# Patient Record
Sex: FEMALE | Race: OTHER | HISPANIC OR LATINO | URBAN - METROPOLITAN AREA
[De-identification: names, ages, dates, MRNs, and addresses within clinical notes are randomized per-mention and may not be internally consistent; named-entity substitution may affect disease eponyms.]

---

## 2023-08-17 LAB
ALANINE AMINOTRANSFERASE (SGPT) (U/L) IN SER/PLAS: 18 U/L (ref 7–45)
ALBUMIN (G/DL) IN SER/PLAS: 4.7 G/DL (ref 3.4–5)
ALKALINE PHOSPHATASE (U/L) IN SER/PLAS: 51 U/L (ref 33–110)
ANION GAP IN SER/PLAS: 12 MMOL/L (ref 10–20)
ASPARTATE AMINOTRANSFERASE (SGOT) (U/L) IN SER/PLAS: 23 U/L (ref 9–39)
BASOPHILS (10*3/UL) IN BLOOD BY AUTOMATED COUNT: 0.04 X10E9/L (ref 0–0.1)
BASOPHILS/100 LEUKOCYTES IN BLOOD BY AUTOMATED COUNT: 1 % (ref 0–2)
BILIRUBIN TOTAL (MG/DL) IN SER/PLAS: 0.7 MG/DL (ref 0–1.2)
C REACTIVE PROTEIN (MG/L) IN SER/PLAS: <0.1 MG/DL
CALCIUM (MG/DL) IN SER/PLAS: 9.9 MG/DL (ref 8.6–10.6)
CARBON DIOXIDE, TOTAL (MMOL/L) IN SER/PLAS: 26 MMOL/L (ref 21–32)
CHLORIDE (MMOL/L) IN SER/PLAS: 107 MMOL/L (ref 98–107)
CHOLESTEROL (MG/DL) IN SER/PLAS: 183 MG/DL (ref 0–199)
CHOLESTEROL IN HDL (MG/DL) IN SER/PLAS: 62.7 MG/DL
CHOLESTEROL/HDL RATIO: 2.9
COBALAMIN (VITAMIN B12) (PG/ML) IN SER/PLAS: 654 PG/ML (ref 211–911)
CREATININE (MG/DL) IN SER/PLAS: 0.9 MG/DL (ref 0.5–1.05)
EOSINOPHILS (10*3/UL) IN BLOOD BY AUTOMATED COUNT: 0.09 X10E9/L (ref 0–0.7)
EOSINOPHILS/100 LEUKOCYTES IN BLOOD BY AUTOMATED COUNT: 2.2 % (ref 0–6)
ERYTHROCYTE DISTRIBUTION WIDTH (RATIO) BY AUTOMATED COUNT: 13 % (ref 11.5–14.5)
ERYTHROCYTE MEAN CORPUSCULAR HEMOGLOBIN CONCENTRATION (G/DL) BY AUTOMATED: 32.4 G/DL (ref 32–36)
ERYTHROCYTE MEAN CORPUSCULAR VOLUME (FL) BY AUTOMATED COUNT: 97 FL (ref 80–100)
ERYTHROCYTES (10*6/UL) IN BLOOD BY AUTOMATED COUNT: 3.99 X10E12/L (ref 4–5.2)
FOLATE (NG/ML) IN SER/PLAS: >22.3 NG/ML
GFR FEMALE: >90 ML/MIN/1.73M2
GLUCOSE (MG/DL) IN SER/PLAS: 78 MG/DL (ref 74–99)
HEMATOCRIT (%) IN BLOOD BY AUTOMATED COUNT: 38.6 % (ref 36–46)
HEMOGLOBIN (G/DL) IN BLOOD: 12.5 G/DL (ref 12–16)
IMMATURE GRANULOCYTES/100 LEUKOCYTES IN BLOOD BY AUTOMATED COUNT: 0 % (ref 0–0.9)
LDL: 109 MG/DL (ref 0–119)
LEUKOCYTES (10*3/UL) IN BLOOD BY AUTOMATED COUNT: 4.1 X10E9/L (ref 4.4–11.3)
LYMPHOCYTES (10*3/UL) IN BLOOD BY AUTOMATED COUNT: 2.18 X10E9/L (ref 1.2–4.8)
LYMPHOCYTES/100 LEUKOCYTES IN BLOOD BY AUTOMATED COUNT: 52.8 % (ref 13–44)
MONOCYTES (10*3/UL) IN BLOOD BY AUTOMATED COUNT: 0.34 X10E9/L (ref 0.1–1)
MONOCYTES/100 LEUKOCYTES IN BLOOD BY AUTOMATED COUNT: 8.2 % (ref 2–10)
NEUTROPHILS (10*3/UL) IN BLOOD BY AUTOMATED COUNT: 1.48 X10E9/L (ref 1.2–7.7)
NEUTROPHILS/100 LEUKOCYTES IN BLOOD BY AUTOMATED COUNT: 35.8 % (ref 40–80)
NON HDL CHOLESTEROL: 120 MG/DL (ref 0–149)
NRBC (PER 100 WBCS) BY AUTOMATED COUNT: 0 /100 WBC (ref 0–0)
PLATELETS (10*3/UL) IN BLOOD AUTOMATED COUNT: 224 X10E9/L (ref 150–450)
POTASSIUM (MMOL/L) IN SER/PLAS: 4.6 MMOL/L (ref 3.5–5.3)
PROTEIN TOTAL: 6.8 G/DL (ref 6.4–8.2)
SEDIMENTATION RATE, ERYTHROCYTE: <1 MM/H (ref 0–20)
SODIUM (MMOL/L) IN SER/PLAS: 140 MMOL/L (ref 136–145)
THYROTROPIN (MIU/L) IN SER/PLAS BY DETECTION LIMIT <= 0.05 MIU/L: 0.2 MIU/L (ref 0.44–3.98)
THYROXINE (T4) FREE (NG/DL) IN SER/PLAS: 0.7 NG/DL (ref 0.61–1.12)
TISSUE TRANSGLUTAMINASE, IGA: <1 U/ML (ref 0–14)
TRIGLYCERIDE (MG/DL) IN SER/PLAS: 58 MG/DL (ref 0–149)
UREA NITROGEN (MG/DL) IN SER/PLAS: 21 MG/DL (ref 6–23)
VLDL: 12 MG/DL (ref 0–40)

## 2023-11-28 ENCOUNTER — TELEMEDICINE (OUTPATIENT)
Dept: DERMATOLOGY | Facility: CLINIC | Age: 23
End: 2023-11-28
Payer: COMMERCIAL

## 2023-11-28 DIAGNOSIS — L73.0 ACNE SCARRING: ICD-10-CM

## 2023-11-28 DIAGNOSIS — L70.0 ACNE VULGARIS: Primary | ICD-10-CM

## 2023-11-28 PROCEDURE — 99213 OFFICE O/P EST LOW 20 MIN: CPT | Performed by: DERMATOLOGY

## 2023-11-28 RX ORDER — CLINDAMYCIN PHOSPHATE 10 UG/ML
LOTION TOPICAL 2 TIMES DAILY
Qty: 60 ML | Refills: 11 | Status: SHIPPED | OUTPATIENT
Start: 2023-11-28 | End: 2024-11-27

## 2023-11-28 RX ORDER — BENZOYL PEROXIDE 50 MG/ML
1 LIQUID TOPICAL DAILY
Qty: 227 G | Refills: 11 | Status: SHIPPED | OUTPATIENT
Start: 2023-11-28 | End: 2024-11-27

## 2023-11-28 NOTE — PROGRESS NOTES
Subjective     Jen Pathak is a 23 y.o. female who presents for the following: Acne.     Virtual visit for follow up of acne vulgaris. Patient currently using BP wash BID and clindamycin 1% lotion BID. She stopped using spironolactone in June due to side effects - she noted increased urination and headaches. 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 Patient also discontinued adapalene 0.1% gel 1 month ago as she noticed tightening of her face after use.     Previous Treatments:   -Epiduo Forte - helped   -Minocycline - helped Context LCV 2021  -Spironolactone - increased urination, headaches, stopped because she was worried about s/e with cross country races    Review of Systems:  No other skin or systemic complaints other than what is documented elsewhere in the note.    The following portions of the chart were reviewed this encounter and updated as appropriate:          Skin Cancer History  No skin cancer on file.      Specialty Problems    None       Objective   Well appearing patient in no apparent distress; mood and affect are within normal limits.    A focused skin examination was performed. All findings within normal limits unless otherwise noted below.    Assessment/Plan   1. Acne vulgaris  Generalized, Head - Anterior (Face)  Mixed comedonal and papular acne on forehead, cheeks, and chin    Acne vulgaris - mild to moderate, mixed comedonal and inflammatory   -We reviewed the pathogenesis of acne in detail including multifactorial contributing components including components of follicular occlusion, hormonal influences, and inflammatory processes.  -Discussed patient's current regimen and potential treatment options. She is currently not using a facial moisturizer. Will restart adapalene 0.1% gel with the addition of an OTC facial moisturizer.   -Start an over the counter non-scented, non-comedogenic moisturizer of your choice twice daily. Examples:  Neutrogena, Cetaphil, and CeraVe.   -Restart adapalene 0.1% gel - apply small pea sized amount to clean dry face 10 minutes after washing at bedtime, start off BIW. Reviewed side effects of topical retinoids including dryness and irritation.   -Continue benzoyl peroxide wash to face 1-2x/day. Warned that benzoyl peroxide  may bleach sheets and towels.  -Continue clindamycin 1% lotion BID, thin layer to entire face    Related Procedures  Follow Up In Dermatology - Established Patient    Related Medications  clindamycin (Cleocin T) 1 % lotion  Apply topically 2 times a day.    benzoyl peroxide 5 % external wash  Apply 1 Application topically once daily. Dispense 240 mL    2. Acne scarring  Ice pick scarring on bilateral cheeks in areas of previous acne     -Discussed that scars will continue to improve with time.   -Additionally, topical retinoids can help prevent future scarring. See above.       RTC in 6 months VV for follow up of acne vulgaris.     Marichuy Powell,     I saw and evaluated the patient. I personally obtained the key and critical portions of the history and physical exam or was physically present for key and critical portions performed by the resident/fellow. I reviewed the resident/fellow's documentation and discussed the patient with the resident/fellow. I agree with the resident/fellow's medical decision making as documented in the note.    Sunny Tyson MD PhD

## 2023-12-27 ENCOUNTER — LAB (OUTPATIENT)
Dept: LAB | Facility: LAB | Age: 23
End: 2023-12-27
Payer: COMMERCIAL

## 2023-12-27 DIAGNOSIS — T73.3XXA FATIGUE DUE TO EXCESSIVE EXERTION, INITIAL ENCOUNTER: Primary | ICD-10-CM

## 2023-12-27 DIAGNOSIS — T73.3XXA FATIGUE DUE TO EXCESSIVE EXERTION, INITIAL ENCOUNTER: ICD-10-CM

## 2023-12-27 LAB
25(OH)D3 SERPL-MCNC: 44 NG/ML (ref 30–100)
ALBUMIN SERPL BCP-MCNC: 4.6 G/DL (ref 3.4–5)
ALP SERPL-CCNC: 47 U/L (ref 33–110)
ALT SERPL W P-5'-P-CCNC: 13 U/L (ref 7–45)
ANION GAP SERPL CALC-SCNC: 11 MMOL/L (ref 10–20)
AST SERPL W P-5'-P-CCNC: 24 U/L (ref 9–39)
BASOPHILS # BLD AUTO: 0.04 X10*3/UL (ref 0–0.1)
BASOPHILS NFR BLD AUTO: 0.7 %
BILIRUB SERPL-MCNC: 0.4 MG/DL (ref 0–1.2)
BUN SERPL-MCNC: 18 MG/DL (ref 6–23)
CALCIUM SERPL-MCNC: 9.6 MG/DL (ref 8.6–10.3)
CHLORIDE SERPL-SCNC: 105 MMOL/L (ref 98–107)
CO2 SERPL-SCNC: 27 MMOL/L (ref 21–32)
CREAT SERPL-MCNC: 0.78 MG/DL (ref 0.5–1.05)
EOSINOPHIL # BLD AUTO: 0.06 X10*3/UL (ref 0–0.7)
EOSINOPHIL NFR BLD AUTO: 1 %
ERYTHROCYTE [DISTWIDTH] IN BLOOD BY AUTOMATED COUNT: 12.2 % (ref 11.5–14.5)
FERRITIN SERPL-MCNC: 194 NG/ML (ref 8–150)
GFR SERPL CREATININE-BSD FRML MDRD: >90 ML/MIN/1.73M*2
GLUCOSE SERPL-MCNC: 98 MG/DL (ref 74–99)
HCT VFR BLD AUTO: 38.9 % (ref 36–46)
HGB BLD-MCNC: 12.6 G/DL (ref 12–16)
IMM GRANULOCYTES # BLD AUTO: 0.01 X10*3/UL (ref 0–0.7)
IMM GRANULOCYTES NFR BLD AUTO: 0.2 % (ref 0–0.9)
IRON SATN MFR SERPL: 32 % (ref 25–45)
IRON SERPL-MCNC: 111 UG/DL (ref 35–150)
LYMPHOCYTES # BLD AUTO: 2.54 X10*3/UL (ref 1.2–4.8)
LYMPHOCYTES NFR BLD AUTO: 42.6 %
MCH RBC QN AUTO: 31 PG (ref 26–34)
MCHC RBC AUTO-ENTMCNC: 32.4 G/DL (ref 32–36)
MCV RBC AUTO: 96 FL (ref 80–100)
MONOCYTES # BLD AUTO: 0.5 X10*3/UL (ref 0.1–1)
MONOCYTES NFR BLD AUTO: 8.4 %
NEUTROPHILS # BLD AUTO: 2.81 X10*3/UL (ref 1.2–7.7)
NEUTROPHILS NFR BLD AUTO: 47.1 %
NRBC BLD-RTO: 0 /100 WBCS (ref 0–0)
PLATELET # BLD AUTO: 256 X10*3/UL (ref 150–450)
POTASSIUM SERPL-SCNC: 4.2 MMOL/L (ref 3.5–5.3)
PROT SERPL-MCNC: 6.9 G/DL (ref 6.4–8.2)
RBC # BLD AUTO: 4.07 X10*6/UL (ref 4–5.2)
SODIUM SERPL-SCNC: 139 MMOL/L (ref 136–145)
TIBC SERPL-MCNC: 342 UG/DL (ref 240–445)
TSH SERPL-ACNC: 0.56 MIU/L (ref 0.44–3.98)
UIBC SERPL-MCNC: 231 UG/DL (ref 110–370)
WBC # BLD AUTO: 6 X10*3/UL (ref 4.4–11.3)

## 2023-12-27 PROCEDURE — 36415 COLL VENOUS BLD VENIPUNCTURE: CPT

## 2023-12-27 PROCEDURE — 82728 ASSAY OF FERRITIN: CPT

## 2023-12-27 PROCEDURE — 83550 IRON BINDING TEST: CPT

## 2023-12-27 PROCEDURE — 83540 ASSAY OF IRON: CPT

## 2023-12-27 PROCEDURE — 82306 VITAMIN D 25 HYDROXY: CPT

## 2023-12-27 PROCEDURE — 80053 COMPREHEN METABOLIC PANEL: CPT

## 2023-12-27 PROCEDURE — 84443 ASSAY THYROID STIM HORMONE: CPT

## 2023-12-27 PROCEDURE — 85025 COMPLETE CBC W/AUTO DIFF WBC: CPT

## 2024-01-03 ENCOUNTER — TELEMEDICINE (OUTPATIENT)
Dept: ORTHOPEDIC SURGERY | Facility: CLINIC | Age: 24
End: 2024-01-03
Payer: COMMERCIAL

## 2024-01-03 DIAGNOSIS — T73.3XXA FATIGUE DUE TO EXCESSIVE EXERTION, INITIAL ENCOUNTER: Primary | ICD-10-CM

## 2024-01-03 DIAGNOSIS — N91.1 AMENORRHEA, SECONDARY: ICD-10-CM

## 2024-01-03 DIAGNOSIS — M48.48XS STRESS FRACTURE OF SACRUM, SEQUELA: ICD-10-CM

## 2024-01-03 PROCEDURE — 99204 OFFICE O/P NEW MOD 45 MIN: CPT | Performed by: PEDIATRICS

## 2024-01-03 NOTE — PROGRESS NOTES
No chief complaint on file.      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  Struggling w amenorrhea for 3 years.  When mileage increases loses period. Comes back with decreased mileage  Mild sacral stress reaction R side.     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 Starting masters   Gets 7 hours of sleep.  50% of time   Could get more sleep.  ADHD, forgets to go to bed.     Labs at school  dec 15:  T3 69 ()  T4 0.7   Estrogen 19 ()  Most recent period was the week before those labs  On iron supplement 3 years    Uses nutrition at school -they set her up with an glenna that she uses that when she enters her data she seems to be getting all of the necessary macronutrients.    LMP Dec 7 (apporx) none for several months prior 3/12    Order DXA, cbc, ferritin, iron, T3  ? H pylori       Past [facility] HX:  Specialty Problems    None       ROS  12 point ROS reviewed and is negative except for items listed   Fatigue     Social Hx:  Home:  3 sibs, parents  Sports: XC, track:   School:  [employer]       Medications:   Current Outpatient Medications on File Prior to Visit   Medication Sig Dispense Refill    benzoyl peroxide 5 % external wash Apply 1 Application topically once daily. Dispense 240 mL 227 g 11    clindamycin (Cleocin T) 1 % lotion Apply topically 2 times a day. 60 mL 11     No current facility-administered medications on file prior to visit.         Allergies:  Not on File     Physical Exam:    There were no vitals taken for this visit.   General appearance: Well-appearing well-nourished  Psych: Normal mood and affect    Vascular: No extremity edema or  discoloration.  Skin: negative.    Eyes: no conjunctival injection.      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 No body image issues.  No resistance to increasing calories.     Plan: DXA, repeat ferritin, CBC, h pylori if GI testing abnormal.    We discussed that because she gets her period when her mileage decreases that the most likely cause of her amenorrhea is decreased calorie intake.  She is continuing on with her GI workup to rule out malabsorption, celiac testing was negative.  I would consider H. pylori testing if her next evaluation is abnormal.    As a strategy for increasing her calories since she knows she usually gets her period when she is training around 50 miles a week she will continue to eat the same number of calories on a daily basis that she does when she is running 50 miles a week and for every mile above 50 she will add in 100 rupal to her diet that she eats regularly.    I would not recommend increasing her weekly mileage to ED for the next season since she got a stress fracture at 70 miles per week.  Instead I would increase the intensity of her training runs and add crosstraining days as her recovery days instead of long slow recovery rounds.    We discussed strategies to improve her sleep.  She can set an alarm on her phone to remind her when to go to bed she can use melatonin during the transition.  To help her feel sleepy earlier and try 400 mg of magnesium glycinate in the evening as well.  55 min virtual visit     ** Please excuse any errors in grammar or translation related to this dictation. Voice recognition software was utilized to prepare this document. **

## 2024-02-29 ENCOUNTER — APPOINTMENT (OUTPATIENT)
Dept: PRIMARY CARE | Facility: CLINIC | Age: 24
End: 2024-02-29
Payer: COMMERCIAL

## 2024-02-29 ENCOUNTER — APPOINTMENT (OUTPATIENT)
Dept: ORTHOPEDIC SURGERY | Facility: CLINIC | Age: 24
End: 2024-02-29
Payer: COMMERCIAL

## 2024-03-01 ENCOUNTER — APPOINTMENT (OUTPATIENT)
Dept: GASTROENTEROLOGY | Facility: HOSPITAL | Age: 24
End: 2024-03-01
Payer: COMMERCIAL

## 2024-04-19 ENCOUNTER — APPOINTMENT (OUTPATIENT)
Dept: GASTROENTEROLOGY | Facility: HOSPITAL | Age: 24
End: 2024-04-19
Payer: COMMERCIAL

## 2024-05-17 ENCOUNTER — APPOINTMENT (OUTPATIENT)
Dept: ENDOCRINOLOGY | Facility: CLINIC | Age: 24
End: 2024-05-17
Payer: COMMERCIAL

## 2024-05-28 ENCOUNTER — APPOINTMENT (OUTPATIENT)
Dept: DERMATOLOGY | Facility: CLINIC | Age: 24
End: 2024-05-28
Payer: COMMERCIAL

## 2024-07-10 NOTE — TELEPHONE ENCOUNTER
Attempted to speak with patient in regards to rescheduling cancelled procedure. VM left for patient to return call to office at earliest convenience.

## 2024-07-31 ENCOUNTER — TELEPHONE (OUTPATIENT)
Dept: GASTROENTEROLOGY | Facility: CLINIC | Age: 24
End: 2024-07-31
Payer: COMMERCIAL

## 2024-07-31 NOTE — TELEPHONE ENCOUNTER
Left message for patient to call back to schedule egd with josie at [facility]; left number for contact.

## 2024-08-02 ENCOUNTER — HOSPITAL ENCOUNTER (OUTPATIENT)
Dept: GASTROENTEROLOGY | Facility: HOSPITAL | Age: 24
Setting detail: OUTPATIENT SURGERY
Discharge: HOME | End: 2024-08-02
Payer: COMMERCIAL

## 2024-08-02 ENCOUNTER — ANESTHESIA EVENT (OUTPATIENT)
Dept: GASTROENTEROLOGY | Facility: HOSPITAL | Age: 24
End: 2024-08-02
Payer: COMMERCIAL

## 2024-08-02 ENCOUNTER — ANESTHESIA (OUTPATIENT)
Dept: GASTROENTEROLOGY | Facility: HOSPITAL | Age: 24
End: 2024-08-02
Payer: COMMERCIAL

## 2024-08-02 VITALS
HEIGHT: 67 IN | WEIGHT: 145 LBS | HEART RATE: 85 BPM | BODY MASS INDEX: 22.76 KG/M2 | OXYGEN SATURATION: 97 % | SYSTOLIC BLOOD PRESSURE: 101 MMHG | RESPIRATION RATE: 16 BRPM | DIASTOLIC BLOOD PRESSURE: 59 MMHG | TEMPERATURE: 96.8 F

## 2024-08-02 DIAGNOSIS — R14.0 ABDOMINAL DISTENSION (GASEOUS): ICD-10-CM

## 2024-08-02 DIAGNOSIS — R12 HEARTBURN: ICD-10-CM

## 2024-08-02 LAB — HCG UR QL IA.RAPID: NEGATIVE

## 2024-08-02 PROCEDURE — 2500000005 HC RX 250 GENERAL PHARMACY W/O HCPCS: Performed by: ANESTHESIOLOGIST ASSISTANT

## 2024-08-02 PROCEDURE — 2500000001 HC RX 250 WO HCPCS SELF ADMINISTERED DRUGS (ALT 637 FOR MEDICARE OP): Performed by: ANESTHESIOLOGIST ASSISTANT

## 2024-08-02 PROCEDURE — 2500000004 HC RX 250 GENERAL PHARMACY W/ HCPCS (ALT 636 FOR OP/ED): Performed by: ANESTHESIOLOGIST ASSISTANT

## 2024-08-02 PROCEDURE — 81025 URINE PREGNANCY TEST: CPT | Performed by: STUDENT IN AN ORGANIZED HEALTH CARE EDUCATION/TRAINING PROGRAM

## 2024-08-02 PROCEDURE — 43235 EGD DIAGNOSTIC BRUSH WASH: CPT | Performed by: STUDENT IN AN ORGANIZED HEALTH CARE EDUCATION/TRAINING PROGRAM

## 2024-08-02 RX ORDER — DEXTROAMPHETAMINE SACCHARATE, AMPHETAMINE ASPARTATE, DEXTROAMPHETAMINE SULFATE AND AMPHETAMINE SULFATE 7.5; 7.5; 7.5; 7.5 MG/1; MG/1; MG/1; MG/1
30 TABLET ORAL DAILY
COMMUNITY

## 2024-08-02 RX ORDER — PROPOFOL 10 MG/ML
INJECTION, EMULSION INTRAVENOUS AS NEEDED
Status: DISCONTINUED | OUTPATIENT
Start: 2024-08-02 | End: 2024-08-02

## 2024-08-02 RX ORDER — MIDAZOLAM HYDROCHLORIDE 1 MG/ML
INJECTION, SOLUTION INTRAMUSCULAR; INTRAVENOUS AS NEEDED
Status: DISCONTINUED | OUTPATIENT
Start: 2024-08-02 | End: 2024-08-02

## 2024-08-02 RX ORDER — GLYCOPYRROLATE 0.2 MG/ML
INJECTION INTRAMUSCULAR; INTRAVENOUS AS NEEDED
Status: DISCONTINUED | OUTPATIENT
Start: 2024-08-02 | End: 2024-08-02

## 2024-08-02 RX ORDER — LIDOCAINE HYDROCHLORIDE 20 MG/ML
INJECTION, SOLUTION EPIDURAL; INFILTRATION; INTRACAUDAL; PERINEURAL AS NEEDED
Status: DISCONTINUED | OUTPATIENT
Start: 2024-08-02 | End: 2024-08-02

## 2024-08-02 SDOH — HEALTH STABILITY: MENTAL HEALTH: CURRENT SMOKER: 0

## 2024-08-02 ASSESSMENT — PAIN SCALES - GENERAL
PAINLEVEL_OUTOF10: 0 - NO PAIN
PAIN_LEVEL: 0
PAINLEVEL_OUTOF10: 0 - NO PAIN

## 2024-08-02 ASSESSMENT — PAIN - FUNCTIONAL ASSESSMENT
PAIN_FUNCTIONAL_ASSESSMENT: 0-10
PAIN_FUNCTIONAL_ASSESSMENT: 0-10

## 2024-08-02 NOTE — ANESTHESIA POSTPROCEDURE EVALUATION
Patient: Jen Pathak    Procedure Summary       Date: 08/02/24 Room / Location: [facility]    Anesthesia Start: 1413 Anesthesia Stop: 1438    Procedures:       EGD      BRAVO Diagnosis:       Abdominal distension (gaseous)      Heartburn    Scheduled Providers: Daily Huerta MD Responsible Provider: Jasmeet Cline MD    Anesthesia Type: MAC ASA Status: Not recorded            Anesthesia Type: MAC    Vitals Value Taken Time   /56 08/02/24 1438   Temp 36.1 °C (97 °F) 08/02/24 1438   Pulse 53 08/02/24 1438   Resp 12 08/02/24 1438   SpO2 98 % 08/02/24 1438       Anesthesia Post Evaluation    Patient location during evaluation: PACU  Patient participation: complete - patient participated  Level of consciousness: sleepy but conscious  Pain score: 0  Pain management: adequate  Airway patency: patent  Cardiovascular status: acceptable  Respiratory status: acceptable  Hydration status: acceptable  Postoperative Nausea and Vomiting: none    No notable events documented.

## 2024-08-02 NOTE — H&P
Procedure H&P    Patient Profile-Procedures  Name Jen Hwang  Date of Birth 2000  MRN [id number]  Address   [address] [place] [id number] [place] [address]    Primary Phone Number [phone]  Secondary Phone Number    Yelena Leonard    Procedure(s):  Procedures: EGD  Primary contact name and number   Extended Emergency Contact Information  Primary Emergency Contact: GIORGI HWANG  Mobile Phone: [phone]  Relation: Mother  Preferred language: English   needed? No    General Health  Weight There were no vitals filed for this visit.  BMI There is no height or weight on file to calculate BMI.    Allergies  No Known Allergies    Past Medical History   Past Medical History:   Diagnosis Date    Encounter for routine child health examination without abnormal findings 01/29/2020    Encounter for routine child health examination without abnormal findings    Left lower quadrant pain 04/08/2020    Abdominal pain, acute, left lower quadrant    Other injury of other muscle(s) and tendon(s) at lower leg level, unspecified leg, initial encounter 08/14/2019    Bobby splint    Other specified health status     No pertinent past medical history    Pain in right leg 08/07/2019    Pain in both lower extremities    Personal history of other diseases of the digestive system 04/08/2020    History of umbilical hernia    Personal history of other specified conditions 09/09/2020    History of epigastric pain    Personal history of other specified conditions 08/14/2019    History of fatigue    Personal history of other specified conditions 11/17/2017    History of urinary frequency    Strain of muscle, fascia and tendon of abdomen, initial encounter 04/08/2020    Strain of rectus abdominis muscle, initial encounter    Stress incontinence (female) (male)     Female stress incontinence    Unspecified abdominal pain 04/06/2020    Stomach pain    Urge incontinence 11/17/2017    Urge  incontinence of urine       Provider assessment  Diagnosis:  EGD with Bravo placement  Medication Reviewed - yes  Prior to Admission medications    Medication Sig Start Date End Date Taking? Authorizing Provider   benzoyl peroxide 5 % external wash Apply 1 Application topically once daily. Dispense 240 mL 11/28/23 11/27/24  Marichuy Powell, DO   clindamycin (Cleocin T) 1 % lotion Apply topically 2 times a day. 11/28/23 11/27/24  Marichuy Powell, DO       Physical Exam  There were no vitals filed for this visit.     General: A&Ox3, NAD.  HEENT: AT/NC.   CV: RRR. No murmur.  Resp: CTA bilaterally. No wheezing, rhonchi or rales.   GI: Soft, NT/ND. BSx4.  Extrem: No edema. Pulses intact.  Neuro: No focal deficits.   Psych: Normal mood and affect.      Procedure Plan - pre-procedural (re)assesment completed by physician:  discharge/transfer patient when discharge criteria met    Daily Huerta MD  8/2/2024 1:23 PM

## 2024-08-02 NOTE — ANESTHESIA PREPROCEDURE EVALUATION
Patient: Jen Pathak    Procedure Information       Anesthesia Start Date/Time: 08/02/24 1413    Scheduled providers: Daily Huerta MD    Procedures:       EGD      BRAVO    Location: [facility]            Relevant Problems   No relevant active problems       Clinical information reviewed:   Tobacco  Allergies  Meds   Med Hx  Surg Hx   Fam Hx  Soc Hx        NPO Detail:  NPO/Void Status  Date of Last Liquid: 08/02/24  Time of Last Liquid: 1030  Date of Last Solid: 08/01/24  Time of Last Solid: 2300  Time of Last Void: 1345         Physical Exam    Airway  Mallampati: I  TM distance: >3 FB  Neck ROM: full     Cardiovascular   Rhythm: regular  Rate: normal     Dental - normal exam     Pulmonary   Breath sounds clear to auscultation     Abdominal            Anesthesia Plan    History of general anesthesia?: yes  History of complications of general anesthesia?: no    ASA 1     general     The patient is not a current smoker.    intravenous induction   Anesthetic plan and risks discussed with patient.    Plan discussed with CAA.

## 2024-08-12 ENCOUNTER — APPOINTMENT (OUTPATIENT)
Dept: PRIMARY CARE | Facility: CLINIC | Age: 24
End: 2024-08-12
Payer: COMMERCIAL

## 2024-08-12 VITALS
HEART RATE: 57 BPM | SYSTOLIC BLOOD PRESSURE: 111 MMHG | WEIGHT: 148 LBS | BODY MASS INDEX: 22.43 KG/M2 | OXYGEN SATURATION: 99 % | TEMPERATURE: 97.1 F | HEIGHT: 68 IN | DIASTOLIC BLOOD PRESSURE: 62 MMHG | RESPIRATION RATE: 16 BRPM

## 2024-08-12 DIAGNOSIS — Z00.00 HEALTHCARE MAINTENANCE: Primary | ICD-10-CM

## 2024-08-12 PROBLEM — N94.6 DYSMENORRHEA IN ADOLESCENT: Status: RESOLVED | Noted: 2024-08-12 | Resolved: 2024-08-12

## 2024-08-12 PROBLEM — N93.9 ABNORMAL UTERINE BLEEDING: Status: RESOLVED | Noted: 2023-11-22 | Resolved: 2024-08-12

## 2024-08-12 PROBLEM — R14.0 BLOATING: Status: ACTIVE | Noted: 2024-08-12

## 2024-08-12 PROBLEM — K42.9 UMBILICAL HERNIA: Status: RESOLVED | Noted: 2024-08-12 | Resolved: 2024-08-12

## 2024-08-12 PROBLEM — Q67.7 PECTUS CARINATUM: Status: RESOLVED | Noted: 2024-08-12 | Resolved: 2024-08-12

## 2024-08-12 PROBLEM — K21.9 GASTROESOPHAGEAL REFLUX DISEASE: Status: ACTIVE | Noted: 2024-08-12

## 2024-08-12 PROBLEM — N91.1 SECONDARY AMENORRHEA: Status: RESOLVED | Noted: 2023-11-22 | Resolved: 2024-08-12

## 2024-08-12 PROBLEM — R41.840 POOR CONCENTRATION: Status: RESOLVED | Noted: 2024-08-12 | Resolved: 2024-08-12

## 2024-08-12 PROBLEM — L70.0 ACNE VULGARIS: Status: ACTIVE | Noted: 2021-05-28

## 2024-08-12 PROBLEM — R59.1 LYMPHADENOPATHY: Status: RESOLVED | Noted: 2024-08-12 | Resolved: 2024-08-12

## 2024-08-12 PROBLEM — K59.09 CHRONIC CONSTIPATION: Status: ACTIVE | Noted: 2024-08-12

## 2024-08-12 PROBLEM — M81.0 OSTEOPOROSIS: Status: RESOLVED | Noted: 2024-02-01 | Resolved: 2024-08-12

## 2024-08-12 LAB
NON-UH HIE A/G RATIO: 1.6
NON-UH HIE ALB: 4.2 G/DL (ref 3.4–5)
NON-UH HIE ALK PHOS: 67 UNIT/L (ref 45–117)
NON-UH HIE BASO COUNT: 0.04 X1000 (ref 0–0.2)
NON-UH HIE BASOS %: 0.8 %
NON-UH HIE BILIRUBIN, TOTAL: 0.6 MG/DL (ref 0.3–1.2)
NON-UH HIE BUN/CREAT RATIO: 28.8
NON-UH HIE BUN: 23 MG/DL (ref 9–23)
NON-UH HIE CALCIUM: 9.7 MG/DL (ref 8.7–10.4)
NON-UH HIE CALCULATED LDL CHOLESTEROL: 109 MG/DL (ref 60–130)
NON-UH HIE CALCULATED OSMOLALITY: 281 MOSM/KG (ref 275–295)
NON-UH HIE CHLORIDE: 106 MMOL/L (ref 98–107)
NON-UH HIE CHOLESTEROL: 180 MG/DL (ref 100–200)
NON-UH HIE CO2, VENOUS: 26 MMOL/L (ref 20–31)
NON-UH HIE CREATININE: 0.8 MG/DL (ref 0.5–0.8)
NON-UH HIE DIFF?: NO
NON-UH HIE EOS COUNT: 0.07 X1000 (ref 0–0.5)
NON-UH HIE EOSIN %: 1.3 %
NON-UH HIE GFR AA: >60
NON-UH HIE GLOBULIN: 2.7 G/DL
NON-UH HIE GLOMERULAR FILTRATION RATE: >60 ML/MIN/1.73M?
NON-UH HIE GLUCOSE: 86 MG/DL (ref 74–106)
NON-UH HIE GOT: 34 UNIT/L (ref 15–37)
NON-UH HIE GPT: 24 UNIT/L (ref 10–49)
NON-UH HIE HCT: 40.7 % (ref 36–46)
NON-UH HIE HDL CHOLESTEROL: 60 MG/DL (ref 40–60)
NON-UH HIE HGB: 13.4 G/DL (ref 12–16)
NON-UH HIE INSTR WBC: 5.7
NON-UH HIE K: 4.9 MMOL/L (ref 3.5–5.1)
NON-UH HIE LYMPH %: 33 %
NON-UH HIE LYMPH COUNT: 1.89 X1000 (ref 1.2–4.8)
NON-UH HIE MCH: 30.5 PG (ref 27–34)
NON-UH HIE MCHC: 33 G/DL (ref 32–37)
NON-UH HIE MCV: 92.5 FL (ref 80–100)
NON-UH HIE MONO %: 10.4 %
NON-UH HIE MONO COUNT: 0.6 X1000 (ref 0.1–1)
NON-UH HIE MPV: 8.4 FL (ref 7.4–10.4)
NON-UH HIE NA: 139 MMOL/L (ref 135–145)
NON-UH HIE NEUTROPHIL %: 54.5 %
NON-UH HIE NEUTROPHIL COUNT (ANC): 3.12 X1000 (ref 1.4–8.8)
NON-UH HIE NUCLEATED RBC: 0 /100WBC
NON-UH HIE PLATELET: 271 X10 (ref 150–450)
NON-UH HIE RBC: 4.4 X10 (ref 4.2–5.4)
NON-UH HIE RDW: 13.2 % (ref 11.5–14.5)
NON-UH HIE TOTAL CHOL/HDL CHOL RATIO: 3
NON-UH HIE TOTAL PROTEIN: 6.9 G/DL (ref 5.7–8.2)
NON-UH HIE TRIGLYCERIDES: 57 MG/DL (ref 30–150)
NON-UH HIE VIT D 25: 52 NG/ML
NON-UH HIE WBC: 5.7 X10 (ref 4.5–11)

## 2024-08-12 PROCEDURE — 99395 PREV VISIT EST AGE 18-39: CPT | Performed by: INTERNAL MEDICINE

## 2024-08-12 PROCEDURE — 1036F TOBACCO NON-USER: CPT | Performed by: INTERNAL MEDICINE

## 2024-08-12 PROCEDURE — 3008F BODY MASS INDEX DOCD: CPT | Performed by: INTERNAL MEDICINE

## 2024-08-12 ASSESSMENT — PATIENT HEALTH QUESTIONNAIRE - PHQ9
SUM OF ALL RESPONSES TO PHQ9 QUESTIONS 1 AND 2: 0
1. LITTLE INTEREST OR PLEASURE IN DOING THINGS: NOT AT ALL
3. TROUBLE FALLING OR STAYING ASLEEP OR SLEEPING TOO MUCH: NOT AT ALL
6. FEELING BAD ABOUT YOURSELF - OR THAT YOU ARE A FAILURE OR HAVE LET YOURSELF OR YOUR FAMILY DOWN: NOT AT ALL
4. FEELING TIRED OR HAVING LITTLE ENERGY: NOT AT ALL
8. MOVING OR SPEAKING SO SLOWLY THAT OTHER PEOPLE COULD HAVE NOTICED. OR THE OPPOSITE, BEING SO FIGETY OR RESTLESS THAT YOU HAVE BEEN MOVING AROUND A LOT MORE THAN USUAL: NOT AT ALL
7. TROUBLE CONCENTRATING ON THINGS, SUCH AS READING THE NEWSPAPER OR WATCHING TELEVISION: SEVERAL DAYS
5. POOR APPETITE OR OVEREATING: NOT AT ALL
10. IF YOU CHECKED OFF ANY PROBLEMS, HOW DIFFICULT HAVE THESE PROBLEMS MADE IT FOR YOU TO DO YOUR WORK, TAKE CARE OF THINGS AT HOME, OR GET ALONG WITH OTHER PEOPLE: NOT DIFFICULT AT ALL
9. THOUGHTS THAT YOU WOULD BE BETTER OFF DEAD, OR OF HURTING YOURSELF: NOT AT ALL
2. FEELING DOWN, DEPRESSED OR HOPELESS: NOT AT ALL
SUM OF ALL RESPONSES TO PHQ QUESTIONS 1-9: 1

## 2024-08-12 ASSESSMENT — ENCOUNTER SYMPTOMS
CONFUSION: 0
ARTHRALGIAS: 0
HEADACHES: 0
CHILLS: 0
DIARRHEA: 0
FATIGUE: 0
SORE THROAT: 0
DIZZINESS: 0
HEMATURIA: 0
CONSTIPATION: 0
COUGH: 0
SHORTNESS OF BREATH: 0
NAUSEA: 0
PALPITATIONS: 0
DYSURIA: 0
BACK PAIN: 0
VOMITING: 0
ABDOMINAL PAIN: 0
FEVER: 0
LIGHT-HEADEDNESS: 0

## 2024-08-12 ASSESSMENT — PAIN SCALES - GENERAL: PAINLEVEL: 0-NO PAIN

## 2024-08-12 NOTE — PROGRESS NOTES
CHIEF COMPLAINT  Annual Exam (Fasting/pap done last November @ [facility])    HISTORY OF PRESENT ILLNESS  Jen Pathak is a 24 y.o. female presents today for follow up of Annual Exam (Fasting/pap done last November @ [facility])    HPI    Has had secondary amenorrhea and a sacral stress fracture.  States she had bone density scan - osteoporosis in low back.  Had a repeat test - states spine was better, but hips worse.  States she is eating more and getting period more regularly.  Seeing doctor for this through [facility].  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   She's taking calcium and working with dietician.   She has 1 more year at [employer] - she's headed back soon for [employer].  She is started her masters program, studying [employer].     Thickened 2nd toenails, bilaterally.  She trims regularly.    REVIEW OF SYSTEMS  Review of Systems   Constitutional:  Negative for chills, fatigue and fever.   HENT:  Negative for sore throat.    Respiratory:  Negative for cough and shortness of breath.    Cardiovascular:  Negative for chest pain, palpitations and leg swelling.   Gastrointestinal:  Negative for abdominal pain, constipation, diarrhea, nausea and vomiting.   Genitourinary:  Negative for dysuria and hematuria.   Musculoskeletal:  Negative for arthralgias, back pain and gait problem.   Skin:  Negative for rash.   Neurological:  Negative for dizziness, light-headedness and headaches.   Psychiatric/Behavioral:  Negative for confusion.        ALLERGIES  Patient has no known allergies.    MEDICATIONS  Current Outpatient Medications   Medication Instructions    amphetamine-dextroamphetamine (Adderall) 30 mg tablet 30 mg, oral, Daily    benzoyl peroxide 5 % external wash 1 Application, Topical, Daily, Dispense 240 mL    clindamycin (Cleocin T) 1 % lotion Topical, 2 times daily       TOBACCO USE  Social History     Tobacco Use   Smoking Status Never   Smokeless Tobacco Never       DEPRESSION  "SCREEN  Over the past 2 weeks, how often have you been bothered by any of the following problems?  Little interest or pleasure in doing things: Not at all  Feeling down, depressed, or hopeless: Not at all  Trouble falling or staying asleep, or sleeping too much: Not at all  Feeling tired or having little energy: Not at all  Poor appetite or overeating: Not at all  Feeling bad about yourself - or that you are a failure or have let yourself or your family down: Not at all  Trouble concentrating on things, such as reading the newspaper or watching television: Several days  Moving or speaking so slowly that other people could have noticed? Or the opposite - being so fidgety or restless that you have been moving around a lot more than usual.: Not at all  Thoughts that you would be better off dead or hurting yourself in some way: Not at all  Patient Health Questionnaire-9 Score: 1    SURGICAL HISTORY  Past Surgical History:  11/30/2020: OTHER SURGICAL HISTORY      Comment:  Umbilical hernia repair       OBJECTIVE    /62   Pulse 57   Temp 36.2 °C (97.1 °F)   Resp 16   Ht 1.715 m (5' 7.5\")   Wt 67.1 kg (148 lb)   LMP 07/21/2024   SpO2 99%   BMI 22.84 kg/m²    BMI: Estimated body mass index is 22.84 kg/m² as calculated from the following:    Height as of this encounter: 1.715 m (5' 7.5\").    Weight as of this encounter: 67.1 kg (148 lb).    BP Readings from Last 3 Encounters:   08/12/24 111/62   08/02/24 101/59   08/17/22 116/63      Wt Readings from Last 3 Encounters:   08/12/24 67.1 kg (148 lb)   08/02/24 65.8 kg (145 lb)   08/17/22 60 kg (132 lb 4 oz)        PHYSICAL EXAM  Physical Exam  Constitutional:       Appearance: Normal appearance.   HENT:      Head: Normocephalic and atraumatic.      Right Ear: Tympanic membrane and ear canal normal.      Left Ear: Tympanic membrane and ear canal normal.      Mouth/Throat:      Mouth: Mucous membranes are moist.      Pharynx: Oropharynx is clear. No oropharyngeal " exudate.   Cardiovascular:      Rate and Rhythm: Normal rate and regular rhythm.      Pulses: Normal pulses.      Heart sounds: No murmur heard.  Pulmonary:      Effort: Pulmonary effort is normal. No respiratory distress.      Breath sounds: Normal breath sounds. No wheezing.   Chest:   Breasts:     Right: Normal. No mass, skin change or tenderness.      Left: Normal. No mass, skin change or tenderness.   Abdominal:      General: There is no distension.      Palpations: Abdomen is soft.      Tenderness: There is no abdominal tenderness.   Musculoskeletal:      Right lower leg: No edema.      Left lower leg: No edema.   Lymphadenopathy:      Upper Body:      Right upper body: No axillary adenopathy.      Left upper body: No axillary adenopathy.   Skin:     Findings: No rash.   Neurological:      General: No focal deficit present.      Mental Status: She is alert and oriented to person, place, and time. Mental status is at baseline.      Gait: Gait normal.   Psychiatric:         Mood and Affect: Mood normal.         Behavior: Behavior normal.         Thought Content: Thought content normal.         Judgment: Judgment normal.          ASSESSMENT AND PLAN  Assessment/Plan   Problem List Items Addressed This Visit       Healthcare maintenance - Primary    Relevant Orders    Lipid Panel    Comprehensive Metabolic Panel    CBC and Auto Differential    Vitamin D 25-Hydroxy,Total (for eval of Vitamin D levels)     Well Visit    BP is normal.  Continue healthy habits.  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  Consider seeing endocrinologist for additional evaluation of Female Athlete Triad.    Check routine labs - CBC, CMP, FLP, Vitamin D.    Pap Nov 2023 [facility].    Reviewed signs of skin cancer and importance of sun protection.    Thickened toenails more likely to running than fungus (no brittleness or discoloration).  Follow-up if symptoms worsen.     Reminded her to stay up to date with routine dental  and eye exams.    Flu shot recommended annually in the fall.    Follow-up 1 year and PRN.

## 2024-08-14 ENCOUNTER — APPOINTMENT (OUTPATIENT)
Dept: GASTROENTEROLOGY | Facility: CLINIC | Age: 24
End: 2024-08-14
Payer: COMMERCIAL

## 2024-12-13 DIAGNOSIS — M48.48XD: Primary | ICD-10-CM

## 2024-12-18 ENCOUNTER — EVALUATION (OUTPATIENT)
Dept: PHYSICAL THERAPY | Facility: CLINIC | Age: 24
End: 2024-12-18
Payer: COMMERCIAL

## 2024-12-18 DIAGNOSIS — M48.48XD: ICD-10-CM

## 2024-12-18 PROCEDURE — 97161 PT EVAL LOW COMPLEX 20 MIN: CPT | Mod: GP

## 2024-12-18 NOTE — PROGRESS NOTES
"Physical Therapy Evaluation  Patient Name Sheri Pathak  MRN: [id number]  Today's Date: 2024  Time Calculation  Start Time: 839  Stop Time: 920  Time Calculation (min): 41 min    Insurance:   Visit #: 1  Insurance Reviewed: [facility] [place]  (per information provided by  pre-cert team)   Authorization required:  [id number]     Therapy Diagnoses:   Problem List Items Addressed This Visit    None  Visit Diagnoses         Codes    Stress fracture of sacrum with routine healing     M48.48XD            General:  Reason for visit: gait analysis and muscle imbalance   Referred by: Liz Treviño MD  Next MD appt:  25 (PCP)  Preferred Name:  SHERI  Script:  PT eval and treat, return to sport and gait analysis  Onset Date:    Most recent assessment/re-assessment: 24 (eval)    Subjective:  HPI: She met with Dr. Treviño following a few injuries this year and recommended gait analysis to work on mechanics. Runs distance for [employer], 6K cross country, 5K and 10K in track. Second to last yr running at [employer]. Working with athletic training. Home for winter break.    Pain  #: 0/10  Type of pain:  low back is ache/discomfort and R knee (patellar tendonitis) is ache/discomfort   What increases pain: hamstring stretching in low back & prolonged sitting, running R knee  What decreases pain:  rest to an extent, OMM hip adjustment    Medical Hx  Precautions: OP    Human Trafficking risk:  No    Patient goal:  \"gait analysis, tips for management, solutions to muscle imbalances\"  Patient is aware of diagnosis and prognosis.    Living Environment:    Social Support:  lives with: family, roommates    Prior Function:  independent with all ADLS and IADLs, with increased running volume    Function:    A and O x 3  Sleep:  instructed in proper postures.  ADL's: no issue  Chores: no issue  Driving: discomfort   Work/school: no issue  Recreation: reduced running distances  Sittinmin  Standing: " unlimited    Walking: unlimited    Objective:    Outcome Measures:  Other Measures  Oswestry Disablity Index (ELIZABETH): 2/50     Posture: WNL    Gait/Stairs:   While wearing Nike Pegasus shoes on TM, walked with decreased arm swing, minimal L foot eversion and R ankle supination. As speed increased to jog and run, increased stance time on L than R and more DF on R than L.     Palpation:  TTP piriformis R>L     ROM:  Trunk Flexion: WNL with increased stretch in L hamstring  Extension: WNL  RSB:  WNL with slight stretch  LSB:  WNL with slight stretch  RR: WNL with slight stretch  LR: WNL with slight stretch    Hip  SLR R 60 L 70  IR L 45 R 45  ER L 60 R 55      MMT:  Hip  Flex: B 4/5  Ext:  B 4+/5  SL ABD: B 4+/5  Sitting ABD: B 4+/5  Sitting ADD: B 4+/5  IR: B 4/5  ER: B 4/5    Knee  Flex: R 4+/5 L 4/5 with p!   Ext: B 4+/5    Ankle  DF: B 4+/5  PF: B 4+/5    Flexibility:  Hamstrings:  90/90:  WNL  Heelcords:  Hip flexors:  Gluts:  Piriformis:    Neuro:  (at re-assessment)    Treatment:    Evaluation:  38 minutes    **= HEP  NV= Next visit  np= not performed  nb= non-billable  G= group HEP= discharged to HEP    Therapeutic Exercise:    Nu-step(subjective taken/education):    NV    Standing hams and hip flexor/calf stretches: NV    Self Care Home Management:  3 minutes  Education:  poc, anatomy, physiology, posture, body mechanics, safety awareness, HEP.  Preferred learning:  pictures, demonstration.  Demonstrated good understanding.                                        Assessment:    Stable and/or uncomplicated characteristics  Level of complexity:  low  Patient presents post-sacral fracture with routine healing, signs and symptoms are consistent with diagnosis and patient is an excellent candidate for Physical Therapy and needs work on/Skilled PT for ROM, flexibility, dynamic stabilization, strength, posture, body mechanics and gait/stairs for improved overall function.       Problems:    Pain:  _x__  Posture/Body  mechanics deficits:  _x__  Decreased knowledge HEP:  _x__  Decreased knowledge of Precautions:  ___  Activity Limitations:   _x__  ADL's/IADL's/Self-care skills:  ___  ROM/Joint Mobility:  __x_  Strength:  _x__  Decreased functional level:   _x__  Flexibility:  _x__  Tenderness/decreased soft tissue mobility:  __x_  Gait/Stairs:  ___  Fall Risk:  ___  Balance:  ___  Edema:  ___  Participation restrictions:  __x_  Sensory:  ___  Transfers:  ___  Decreased knowledge of brace:   ___  Other:  ___    X Indicates included in problem list    Goals:    STG:    -Compliant with HEP.   LTG:  by discharge  - pain to: 0/10 and patient I with self management of symptoms.   -Decreased pain and increased function with ADL's and IADL's.  Improve Oswestry to: 0%  limitation of function.  -Increase trunk and B LE AROM to WFL for improved function with prolonged running.    -Increase trunk strength and stability and R/L LE strength to WFL for improved with prolonged running.  -Increase B LE flexibility to WFL.    -Decrease B lower quarter tenderness 50-75% per patient report.  -I and compliant with HEP and proper: LE/lower back care.     Rehab potential to achieve the above goals is good.    Patient is aware of diagnosis and prognosis and agrees with established plan of care and goals.    Patient is interested in gait analysis with camera and sensors.     Plan:   Skilled PT 1-2 x/week for 4 weeks (Until goals achieved, maximum rehab potential has been achieved, or patient has plateaued) for:    Aquatics  _____  CP   __x__  Dry needling  ____  Education  __x__  Electrical Stimulation  ____  Gait training  __x__  HEP  _x___  Manual  __x__  Mechanical Traction  ____  NMR  _x___  Self-care/home management  __x__  Therapeutic Exercise   __x__  Therapeutic Activities  __x__  US  ____  Work Conditioning  ____  Re-assessment  __x__  Other  ____    X Indicates included in treatment plan    PT for Nu-step/TM for functional mobility and  soft tissue warm up for more efficient stretching, work on ROM, flexibility, dynamic stabilization, strength, posture, body mechanics and gait/stairs for improved overall function.

## 2025-03-03 ENCOUNTER — APPOINTMENT (OUTPATIENT)
Dept: PHYSICAL THERAPY | Facility: CLINIC | Age: 25
End: 2025-03-03
Payer: COMMERCIAL

## 2025-06-02 ENCOUNTER — APPOINTMENT (OUTPATIENT)
Dept: PHYSICAL THERAPY | Facility: CLINIC | Age: 25
End: 2025-06-02
Payer: COMMERCIAL

## 2025-08-14 ENCOUNTER — APPOINTMENT (OUTPATIENT)
Dept: PRIMARY CARE | Facility: CLINIC | Age: 25
End: 2025-08-14
Payer: COMMERCIAL